# Patient Record
Sex: FEMALE | Race: WHITE | Employment: PART TIME | URBAN - METROPOLITAN AREA
[De-identification: names, ages, dates, MRNs, and addresses within clinical notes are randomized per-mention and may not be internally consistent; named-entity substitution may affect disease eponyms.]

---

## 2021-08-30 ENCOUNTER — OFFICE VISIT (OUTPATIENT)
Dept: DERMATOLOGY | Age: 21
End: 2021-08-30
Payer: MEDICAID

## 2021-08-30 VITALS
SYSTOLIC BLOOD PRESSURE: 134 MMHG | DIASTOLIC BLOOD PRESSURE: 85 MMHG | TEMPERATURE: 97 F | OXYGEN SATURATION: 100 % | HEART RATE: 98 BPM | BODY MASS INDEX: 25.01 KG/M2 | HEIGHT: 68 IN | WEIGHT: 165 LBS

## 2021-08-30 DIAGNOSIS — L70.0 ACNE VULGARIS: Primary | ICD-10-CM

## 2021-08-30 PROCEDURE — G8427 DOCREV CUR MEDS BY ELIG CLIN: HCPCS | Performed by: DERMATOLOGY

## 2021-08-30 PROCEDURE — 1036F TOBACCO NON-USER: CPT | Performed by: DERMATOLOGY

## 2021-08-30 PROCEDURE — 99204 OFFICE O/P NEW MOD 45 MIN: CPT | Performed by: DERMATOLOGY

## 2021-08-30 PROCEDURE — G8419 CALC BMI OUT NRM PARAM NOF/U: HCPCS | Performed by: DERMATOLOGY

## 2021-08-30 RX ORDER — NORGESTIMATE AND ETHINYL ESTRADIOL 0.25-0.035
KIT ORAL
COMMUNITY

## 2021-08-30 RX ORDER — DROSPIRENONE AND ETHINYL ESTRADIOL 0.03MG-3MG
1 KIT ORAL
COMMUNITY

## 2021-08-30 RX ORDER — CLINDAMYCIN PHOSPHATE 10 UG/ML
LOTION TOPICAL
Qty: 60 ML | Refills: 3 | Status: SHIPPED | OUTPATIENT
Start: 2021-08-30 | End: 2021-11-29 | Stop reason: SDUPTHER

## 2021-08-30 RX ORDER — NORGESTIMATE AND ETHINYL ESTRADIOL 0.25-0.035
KIT ORAL
COMMUNITY
Start: 2021-08-03 | End: 2021-08-30 | Stop reason: ALTCHOICE

## 2021-08-30 NOTE — PATIENT INSTRUCTIONS
Can use Cerave PM moisturizer and The Ordinary azelaic acid if needed      Mornin. Wash with over the counter benzoyl peroxide wash (examples include: Cerave Acne Foaming Cream Cleanser, Panoxyl Wash, Acne Free brand oil-free acne cleanser, Neutrogena Clear Pore Cleanser/Mask, Clean and Clear advantage 3 in 1 exfoliating cleanser, Clean and Clear Continuous Control Acne Cleanser, Oxy maximum face wash). Dry your face with white towel to prevent bleaching of clothing. 2. Apply clindamycin 1% lotion to the face. 3. Apply an oil-free, non-comedogenic moisturizer, ideally with SPF 30+ in it. Night time:   1. Wash with a gentle face wash (such as Cerave or Cetaphil)  2. Apply a pea-sized amount of Tretinoin 0.025% cream/onto your finger. Dab the medicine onto your forehead, nose, chin, and each cheek. Then gently spread a thin layer across the entire face. Do not wash off this medicine. These medications can also be used on your chest, back and shoulder areas. 3. Apply an oil-free, non-comedogenic moisturizes (may do this prior to tretinoin if skin is sensitive)    It is important to remember that oil glands respond very slowly and your skin will not change overnight. Your skin may get worse during the first weeks of treatment because all of the clogged pores are opening to the surface. Try to be consistent and follow these instructions from your doctor. If your acne has not responded to these treatments in 2-3 months, your doctor may recommend other medications. Topical acne medications can cause irritation, redness, and dryness, especially when you first start them. If your prescribed topical cream or gel is too irritating at first, try using it every other day or once every 3 days instead of every day. As your skin becomes less sensitive, you may be able to start to use it every day. To help soothe the dryness, you can use an oil-free, \"non-comedogenic\" moisturizer, ideally with SPF in it.   Some products available include: Cetaphil Lotion, Cerave Lotion, Neutrogenia Moisturizer and Thomason [place]. Some people find that applying their moisturizer immediately prior to the topical medication helps, and studies suggest that it does not reduce effectiveness. Please have your pharmacist call our office if you are having trouble getting your medications or need refills. Some insurance companies will not cover tretinoin; however, you may shop around for the best out-of-pocket price using the coupon website goodrx.com. Alternatively, you may purchase Differin (adapalene) gel over the counter and use in the same way. Topical and oral acne medications are not safe for pregnant women. No acne medications should be used by pregnant women without first consulting their physician.

## 2021-08-30 NOTE — PROGRESS NOTES
Dermatology Patient Note  [facility] [address]  [address]  [address]  Dept: [phone]  Dept Fax: [phone]      VISITDATE: 2021   REFERRING PROVIDER: No ref. provider found      Shanda Hess is a 22 y.o. female  who presents today in the office for:    Acne (New PT, has no personal or family history of skin caner. PT presents with c/o acne on her back and face, but is only concerned with her back acne. PT states she currently uses curology. PT says acne has improved but still remains, would like an RX reccomendation. PT says her acne flares up most when she wears the same bra more than once a day. PT states she has had acne since she was 16.  ) and Other (PT states she gets eczema around her lips and has used medication for it in the past. PT states shes been using this medication (in a tube form) for the past 5 years and would like a new RX because its )      HISTORY OF PRESENT ILLNESS:  As above. Has been using Curology for a few months. States that the acne on her back is what bothers her the most. Has used BPO wash on her face but not on her back. Currently using differin gel on her face. Has tried differin on her back but states that it caused some dryness. Has used Panoxyl BPO wash on her face previously. Curology:  - salicyclic acid 2% wash to back  - niacinamide/azelaic/clindamycin lotion    MEDICAL PROBLEMS:  There are no problems to display for this patient. CURRENT MEDICATIONS:   Current Outpatient Medications   Medication Sig Dispense Refill    norgestimate-ethinyl estradiol (ORTHO-CYCLEN) 0.25-35 MG-MCG per tablet Take by mouth      drospirenone-ethinyl estradiol 3-0.03 MG TABS Take 1 tablet by mouth       No current facility-administered medications for this visit.        ALLERGIES:   No Known Allergies    SOCIAL HISTORY:  Social History     Tobacco Use    Smoking status: Never Smoker    Smokeless tobacco: Never Used Substance Use Topics    Alcohol use: Never       Pertinent ROS:  Review of Systems  Skin: Denies any new changing, growing or bleeding lesions or rashes except as described in the HPI   Constitutional: Denies fevers, chills, and malaise. PHYSICAL EXAM:   /85 (Site: Left Upper Arm, Position: Sitting, Cuff Size: Medium Adult)   Pulse 98   Temp 97 °F (36.1 °C)   Ht 5' 8\" (1.727 m)   Wt 165 lb (74.8 kg)   LMP 2021 (Approximate)   SpO2 100%   Breastfeeding No   BMI 25.09 kg/m²     The patient is generally well appearing, well nourished, alert and conversational. Affect is normal.    Cutaneous Exam:  Physical Exam  Acne exam: exam of face, neck, chest, and back was performed. Facial covering was removed during examination. Diagnoses/exam findings/medical history pertinent to this visit are listed below:    Assessment:   Diagnosis Orders   1. Acne vulgaris          Plan:  Acne vulgaris, face and back  - chronic illness, responding to treatment but not yet at goal  benzoyl peroxide 5% wash daily (or OTC alternative, options listed in AVS)  clindamycin 1% lotion daily  tretinoin 0.025% cream nightly   - use Cerave PM moisturizer (contains niacinamide)  - can use The Ordinary azelaic acid if desired    RTC 3 months    No future appointments. Patient Instructions   Can use Cerave PM moisturizer and The Ordinary azelaic acid if needed      Mornin. Wash with over the counter benzoyl peroxide wash (examples include: Cerave Acne Foaming Cream Cleanser, Panoxyl Wash, Acne Free brand oil-free acne cleanser, Neutrogena Clear Pore Cleanser/Mask, Clean and Clear advantage 3 in 1 exfoliating cleanser, Clean and Clear Continuous Control Acne Cleanser, Oxy maximum face wash). Dry your face with white towel to prevent bleaching of clothing. 2. Apply clindamycin 1% lotion to the face. 3. Apply an oil-free, non-comedogenic moisturizer, ideally with SPF 30+ in it. Night time:   1.  Wash with a gentle face wash (such as Cerave or Cetaphil)  2. Apply a pea-sized amount of Tretinoin 0.025% cream/onto your finger. Dab the medicine onto your forehead, nose, chin, and each cheek. Then gently spread a thin layer across the entire face. Do not wash off this medicine. These medications can also be used on your chest, back and shoulder areas. 3. Apply an oil-free, non-comedogenic moisturizes (may do this prior to tretinoin if skin is sensitive)    It is important to remember that oil glands respond very slowly and your skin will not change overnight. Your skin may get worse during the first weeks of treatment because all of the clogged pores are opening to the surface. Try to be consistent and follow these instructions from your doctor. If your acne has not responded to these treatments in 2-3 months, your doctor may recommend other medications. Topical acne medications can cause irritation, redness, and dryness, especially when you first start them. If your prescribed topical cream or gel is too irritating at first, try using it every other day or once every 3 days instead of every day. As your skin becomes less sensitive, you may be able to start to use it every day. To help soothe the dryness, you can use an oil-free, \"non-comedogenic\" moisturizer, ideally with SPF in it. Some products available include: Cetaphil Lotion, Cerave Lotion, Neutrogenia Moisturizer and Aveeno Moisturizer. Some people find that applying their moisturizer immediately prior to the topical medication helps, and studies suggest that it does not reduce effectiveness. Please have your pharmacist call our office if you are having trouble getting your medications or need refills. Some insurance companies will not cover tretinoin; however, you may shop around for the best out-of-pocket price using the coupon website goodrx.com.  Alternatively, you may purchase Differin (adapalene) gel over the counter and use in the same way.    Topical and oral acne medications are not safe for pregnant women. No acne medications should be used by pregnant women without first consulting their physician. This note was created with the assistance of a speech-recognition program.  Although the intention is to generate a document that actually reflects the content of the visit, no guarantees can be provided that every mistake has been identified and corrected by editing. I, Dr. Rayna Castañeda, personally performed the services described in this documentation, as scribed by [facility] in my presence, and it is both accurate and complete.      Electronically signed by Ishan Nava MD on 8/30/21 at 1:09 PM EDT

## 2021-11-29 ENCOUNTER — OFFICE VISIT (OUTPATIENT)
Dept: DERMATOLOGY | Age: 21
End: 2021-11-29
Payer: MEDICAID

## 2021-11-29 VITALS
BODY MASS INDEX: 24.86 KG/M2 | DIASTOLIC BLOOD PRESSURE: 73 MMHG | OXYGEN SATURATION: 98 % | TEMPERATURE: 98 F | HEIGHT: 68 IN | HEART RATE: 97 BPM | SYSTOLIC BLOOD PRESSURE: 118 MMHG | WEIGHT: 164 LBS

## 2021-11-29 DIAGNOSIS — L30.8 OTHER ECZEMA: ICD-10-CM

## 2021-11-29 DIAGNOSIS — L70.0 ACNE VULGARIS: Primary | ICD-10-CM

## 2021-11-29 PROCEDURE — G8420 CALC BMI NORM PARAMETERS: HCPCS | Performed by: DERMATOLOGY

## 2021-11-29 PROCEDURE — G8427 DOCREV CUR MEDS BY ELIG CLIN: HCPCS | Performed by: DERMATOLOGY

## 2021-11-29 PROCEDURE — G8484 FLU IMMUNIZE NO ADMIN: HCPCS | Performed by: DERMATOLOGY

## 2021-11-29 PROCEDURE — 99214 OFFICE O/P EST MOD 30 MIN: CPT | Performed by: DERMATOLOGY

## 2021-11-29 PROCEDURE — 1036F TOBACCO NON-USER: CPT | Performed by: DERMATOLOGY

## 2021-11-29 RX ORDER — CLINDAMYCIN PHOSPHATE 10 UG/ML
LOTION TOPICAL
Qty: 60 ML | Refills: 3 | Status: SHIPPED | OUTPATIENT
Start: 2021-11-29

## 2021-11-29 RX ORDER — TRIAMCINOLONE ACETONIDE 0.25 MG/G
CREAM TOPICAL
Qty: 80 G | Refills: 2 | Status: SHIPPED | OUTPATIENT
Start: 2021-11-29 | End: 2022-03-30

## 2021-11-29 NOTE — PATIENT INSTRUCTIONS
Continue benzoyl peroxide wash and clindamycin lotion. Add tretinoin nightly. Follow up in 4-5 months.

## 2021-11-29 NOTE — PROGRESS NOTES
Dermatology Patient Note  [facility] [address]  [address]  [address] [facility] [address]  Dept: [phone]  Dept Fax: [phone]      VISITDATE: 11/29/2021   REFERRING PROVIDER: No ref. provider found      Montserrat Zamora is a 22 y.o. female  who presents today in the office for:    Follow-up (3 mo acne- BPO, clindamycin, tretinoin (does not have)- back improved, face not better )      HISTORY OF PRESENT ILLNESS:  As above. She currently uses Differin gel as a spot treatment. She does experience menstrual flares. She states she never received tretinoin and was told the rx was not sent to pharmacy  Patient would like a refill of triamcinolone 0.025 for eczema around her mouth during the winter. MEDICAL PROBLEMS:  There are no problems to display for this patient. CURRENT MEDICATIONS:   Current Outpatient Medications   Medication Sig Dispense Refill    tretinoin (RETIN-A) 0.025 % cream Apply pea sized amount to face nightly 45 g 3    benzoyl peroxide 5 % external liquid Wash affected areas once daily 227 g 3    clindamycin (CLEOCIN T) 1 % lotion Apply to affected areas daily 60 mL 3    triamcinolone (KENALOG) 0.025 % cream Apply to rash on face twice daily as needed 80 g 2    norgestimate-ethinyl estradiol (ORTHO-CYCLEN) 0.25-35 MG-MCG per tablet Take by mouth      drospirenone-ethinyl estradiol 3-0.03 MG TABS Take 1 tablet by mouth (Patient not taking: Reported on 11/29/2021)       No current facility-administered medications for this visit. ALLERGIES:   No Known Allergies    SOCIAL HISTORY:  Social History     Tobacco Use    Smoking status: Never Smoker    Smokeless tobacco: Never Used   Substance Use Topics    Alcohol use: Never       Pertinent ROS:  Review of Systems  Skin: Denies any new changing, growing or bleeding lesions or rashes except as described in the HPI   Constitutional: Denies fevers, chills, and malaise.     PHYSICAL EXAM:   /73   Pulse 97   Temp 98 °F (36.7 °C)   Ht 5' 8\" (1.727 m)   Wt 164 lb (74.4 kg)   LMP 11/24/2021   SpO2 98%   BMI 24.94 kg/m²     The patient is generally well appearing, well nourished, alert and conversational. Affect is normal.    Cutaneous Exam:  Physical Exam  Acne exam: exam of face, neck, chest, and back was performed. Facial covering was removed during examination. Diagnoses/exam findings/medical history pertinent to this visit are listed below:    Assessment:   Diagnosis Orders   1. Acne vulgaris  tretinoin (RETIN-A) 0.025 % cream    benzoyl peroxide 5 % external liquid    clindamycin (CLEOCIN T) 1 % lotion   2. Other eczema  triamcinolone (KENALOG) 0.025 % cream        Plan:  Acne vulgaris  - chronic illness, responding to treatment but not yet at goal   - use tretinoin nightly   - continue BPO wash and clindamycin lotion  - spironolactone in reserve     Perioral eczematous dermatitis  - triamcinolone 0.025 BID as needed     RTC 4-5 months     Future Appointments   Date Time Provider Rich Dalton   3/30/2022  2:30 PM Stephanie Rios MD  derm [facility]         Patient Instructions   Continue benzoyl peroxide wash and clindamycin lotion. Add tretinoin nightly. Follow up in 4-5 months. This note was created with the assistance of a speech-recognition program.  Although the intention is to generate a document that actually reflects the content of the visit, no guarantees can be provided that every mistake has been identified and corrected by editing. I, Dr. Fredi Wan, personally performed the services described in this documentation, as scribed by [facility] in my presence, and it is both accurate and complete.      Electronically signed by Stephanie Rios MD on 11/29/21 at 2:00 PM EST

## 2022-03-30 ENCOUNTER — OFFICE VISIT (OUTPATIENT)
Dept: DERMATOLOGY | Age: 22
End: 2022-03-30
Payer: MEDICAID

## 2022-03-30 VITALS
OXYGEN SATURATION: 100 % | HEART RATE: 72 BPM | HEIGHT: 67 IN | WEIGHT: 161 LBS | TEMPERATURE: 96.1 F | DIASTOLIC BLOOD PRESSURE: 78 MMHG | SYSTOLIC BLOOD PRESSURE: 122 MMHG | BODY MASS INDEX: 25.27 KG/M2

## 2022-03-30 DIAGNOSIS — L70.0 ACNE VULGARIS: Primary | ICD-10-CM

## 2022-03-30 DIAGNOSIS — L70.0 ACNE COMEDONE: ICD-10-CM

## 2022-03-30 PROCEDURE — G8419 CALC BMI OUT NRM PARAM NOF/U: HCPCS | Performed by: DERMATOLOGY

## 2022-03-30 PROCEDURE — 99213 OFFICE O/P EST LOW 20 MIN: CPT | Performed by: DERMATOLOGY

## 2022-03-30 PROCEDURE — G8427 DOCREV CUR MEDS BY ELIG CLIN: HCPCS | Performed by: DERMATOLOGY

## 2022-03-30 PROCEDURE — G8484 FLU IMMUNIZE NO ADMIN: HCPCS | Performed by: DERMATOLOGY

## 2022-03-30 PROCEDURE — 1036F TOBACCO NON-USER: CPT | Performed by: DERMATOLOGY

## 2022-03-30 NOTE — PROGRESS NOTES
Dermatology Patient Note  [place]  [address]  [address] [facility] [address]  Dept: [phone]  Dept Fax: [phone]      VISITDATE: 3/30/2022   REFERRING PROVIDER: No ref. provider found      Tamar Palma is a 22 y.o. female  who presents today in the office for:    Acne (acne is well controlled )      HISTORY OF PRESENT ILLNESS:  As above. Pt states her back is better but she has bumps on her face. She states she is using the prescribed topicals regularly. MEDICAL PROBLEMS:  There are no problems to display for this patient. CURRENT MEDICATIONS:   Current Outpatient Medications   Medication Sig Dispense Refill    tretinoin (RETIN-A) 0.025 % cream Apply pea sized amount to face nightly 45 g 3    benzoyl peroxide 5 % external liquid Wash affected areas once daily 227 g 3    clindamycin (CLEOCIN T) 1 % lotion Apply to affected areas daily 60 mL 3    norgestimate-ethinyl estradiol (ORTHO-CYCLEN) 0.25-35 MG-MCG per tablet Take by mouth      drospirenone-ethinyl estradiol 3-0.03 MG TABS Take 1 tablet by mouth        No current facility-administered medications for this visit. ALLERGIES:   No Known Allergies    SOCIAL HISTORY:  Social History     Tobacco Use    Smoking status: Never Smoker    Smokeless tobacco: Never Used   Substance Use Topics    Alcohol use: Never       Pertinent ROS:  Review of Systems  Skin: Denies any new changing, growing or bleeding lesions or rashes except as described in the HPI   Constitutional: Denies fevers, chills, and malaise.     PHYSICAL EXAM:   /78 (Site: Left Upper Arm, Position: Sitting, Cuff Size: Medium Adult)   Pulse 72   Temp 96.1 °F (35.6 °C) (Temporal)   Ht 5' 7\" (1.702 m)   Wt 161 lb (73 kg)   LMP 03/09/2022   SpO2 100%   Breastfeeding No   BMI 25.22 kg/m²     The patient is generally well appearing, well nourished, alert and conversational. Affect is normal.    Cutaneous Exam:  Physical Exam  Focused exam of face and back was performed    Facial covering was removed during examination. Diagnoses/exam findings/medical history pertinent to this visit are listed below:    Assessment:   Diagnosis Orders   1. Acne vulgaris     2. Acne comedone          Plan:  Acne vulgaris of face and back  Acne comedones (very few remaining)  - chronic illness, responding to treatment but not yet at goal  - nearly clear  - continue tretinoin nightly   - continue BPO wash and clindamycin lotion  - extraction of comedones in office  Comedone extraction: After verbal consent, 11 blade was used to puncture, then lateral pressure applied to closed comedones t to extract contents. RTC 1 year    Future Appointments   Date Time Provider Rich Dalton   3/29/2023  2:00 PM Ayah Guerra MD  derm [facility]         Patient Instructions   - continue tretinoin nightly   - continue BPO wash and clindamycin lotion        IMehreen, personally scribed the services dictated to me by Dr. Jony Galvan in this documentation. I, Dr. Jony Galvan, personally performed the services described in this documentation, as scribed by Magdalena Lundberg in my presence, and it is both accurate and complete.     Electronically signed by Ayah Guerra MD on 3/30/2022 at 9:25 PM

## 2022-12-05 DIAGNOSIS — L70.0 ACNE VULGARIS: ICD-10-CM

## 2022-12-06 RX ORDER — BENZOYL PEROXIDE 50 MG/ML
LIQUID TOPICAL
Qty: 227 G | Refills: 3 | OUTPATIENT
Start: 2022-12-06

## 2022-12-06 RX ORDER — CLINDAMYCIN PHOSPHATE 10 UG/ML
LOTION TOPICAL
Qty: 60 ML | Refills: 3 | OUTPATIENT
Start: 2022-12-06

## 2023-03-24 ENCOUNTER — TELEPHONE (OUTPATIENT)
Dept: DERMATOLOGY | Age: 23
End: 2023-03-24

## 2023-03-24 NOTE — TELEPHONE ENCOUNTER
LIYAM to reschedule Dermatology appointment scheduled for 3/29/2023. The Provider will not be in the office on this day.